# Patient Record
Sex: FEMALE
[De-identification: names, ages, dates, MRNs, and addresses within clinical notes are randomized per-mention and may not be internally consistent; named-entity substitution may affect disease eponyms.]

---

## 2019-09-21 ENCOUNTER — HOSPITAL ENCOUNTER (INPATIENT)
Dept: HOSPITAL 92 - L&D | Age: 34
LOS: 2 days | Discharge: HOME | End: 2019-09-23
Attending: OBSTETRICS & GYNECOLOGY | Admitting: OBSTETRICS & GYNECOLOGY
Payer: COMMERCIAL

## 2019-09-21 VITALS — BODY MASS INDEX: 31.4 KG/M2

## 2019-09-21 DIAGNOSIS — Z3A.40: ICD-10-CM

## 2019-09-21 LAB
HBSAG INDEX: 0.12 S/CO (ref 0–0.99)
HGB BLD-MCNC: 10.8 G/DL (ref 12–16)
MCH RBC QN AUTO: 31 PG (ref 27–31)
MCV RBC AUTO: 89.1 FL (ref 78–98)
PLATELET # BLD AUTO: 142 THOU/UL (ref 130–400)
RBC # BLD AUTO: 3.49 MILL/UL (ref 4.2–5.4)
SYPHILIS ANTIBODY INDEX: 0.05 S/CO
WBC # BLD AUTO: 9.9 THOU/UL (ref 4.8–10.8)

## 2019-09-21 PROCEDURE — 86780 TREPONEMA PALLIDUM: CPT

## 2019-09-21 PROCEDURE — 86901 BLOOD TYPING SEROLOGIC RH(D): CPT

## 2019-09-21 PROCEDURE — 86900 BLOOD TYPING SEROLOGIC ABO: CPT

## 2019-09-21 PROCEDURE — 85027 COMPLETE CBC AUTOMATED: CPT

## 2019-09-21 PROCEDURE — 86850 RBC ANTIBODY SCREEN: CPT

## 2019-09-21 PROCEDURE — 36415 COLL VENOUS BLD VENIPUNCTURE: CPT

## 2019-09-21 PROCEDURE — 87340 HEPATITIS B SURFACE AG IA: CPT

## 2019-09-22 RX ADMIN — DOCUSATE CALCIUM SCH MG: 240 CAPSULE, LIQUID FILLED ORAL at 21:28

## 2019-09-22 RX ADMIN — DOCUSATE CALCIUM SCH MG: 240 CAPSULE, LIQUID FILLED ORAL at 09:22

## 2019-09-22 NOTE — PDOC.OPDEL
OB Operative/Delivery Note


Delivery Dr/Surgeon: Sarah


Pre-Delivery Diagnosis: elective induction


Procedure/Post Delivery Dx: spontaneous vaginal delivery


Weeks gestation: 40





- Findings


  ** A


Sex: female


Apgar - 1 min: 8


Apgar - 5 min: 9





- Additional Findings/Plan


Placenta delivered: spontaneous


Repaired Obstetrical Laceration: none


Estimated blood loss: 120 qbl


Post delivery plan: routine recovery

## 2019-09-23 VITALS — DIASTOLIC BLOOD PRESSURE: 64 MMHG | TEMPERATURE: 97.7 F | SYSTOLIC BLOOD PRESSURE: 120 MMHG

## 2019-09-23 RX ADMIN — DOCUSATE CALCIUM SCH MG: 240 CAPSULE, LIQUID FILLED ORAL at 08:24

## 2019-09-23 NOTE — PDOC.PP
Post Partum Progress Note


Post Partum Day #: 1


PO intake tolerated: yes


Flatus: yes


Ambulation: yes


 Vital Signs (12 hours)











  Temp Pulse Resp BP Pulse Ox


 


 09/23/19 05:25  97.6 F  72  16  134/73 


 


 09/23/19 00:15  97.6 F  76  16  142/77 H 


 


 09/22/19 19:50  97.7 F  79  16  130/62  96








 Weight











Weight                         183 lb














Result Diagrams: 


 09/21/19 20:43





Additional Labs: 


 Post Partum Labs











Blood Type  O POSITIVE   09/21/19  21:52    


 


Hep Bs Antigen  Non-Reactive S/CO (NonReactive)   09/21/19  20:43    














- Assessment/Plan





Doing well post partum day 1. Bottle feeding. Discharge home. F/u 6 weeks.